# Patient Record
Sex: MALE | Race: WHITE | NOT HISPANIC OR LATINO | Employment: FULL TIME | URBAN - NONMETROPOLITAN AREA
[De-identification: names, ages, dates, MRNs, and addresses within clinical notes are randomized per-mention and may not be internally consistent; named-entity substitution may affect disease eponyms.]

---

## 2024-08-29 ENCOUNTER — OFFICE VISIT (OUTPATIENT)
Dept: UROLOGY | Facility: CLINIC | Age: 39
End: 2024-08-29
Payer: COMMERCIAL

## 2024-08-29 VITALS
WEIGHT: 270 LBS | OXYGEN SATURATION: 98 % | HEART RATE: 94 BPM | SYSTOLIC BLOOD PRESSURE: 138 MMHG | TEMPERATURE: 98.7 F | DIASTOLIC BLOOD PRESSURE: 92 MMHG | BODY MASS INDEX: 34.65 KG/M2 | HEIGHT: 74 IN

## 2024-08-29 DIAGNOSIS — N48.1 BALANITIS: ICD-10-CM

## 2024-08-29 DIAGNOSIS — R30.0 DYSURIA: Primary | ICD-10-CM

## 2024-08-29 DIAGNOSIS — R31.0 GROSS HEMATURIA: ICD-10-CM

## 2024-08-29 LAB
BILIRUB BLD-MCNC: NEGATIVE MG/DL
CLARITY, POC: CLEAR
COLOR UR: ABNORMAL
EXPIRATION DATE: ABNORMAL
GLUCOSE UR STRIP-MCNC: NEGATIVE MG/DL
KETONES UR QL: NEGATIVE
LEUKOCYTE EST, POC: NEGATIVE
Lab: ABNORMAL
NITRITE UR-MCNC: NEGATIVE MG/ML
PH UR: 6 [PH] (ref 5–8)
PROT UR STRIP-MCNC: NEGATIVE MG/DL
RBC # UR STRIP: ABNORMAL /UL
SP GR UR: 1.01 (ref 1–1.03)
UROBILINOGEN UR QL: ABNORMAL

## 2024-08-29 RX ORDER — CLOTRIMAZOLE AND BETAMETHASONE DIPROPIONATE 10; .64 MG/G; MG/G
1 CREAM TOPICAL 2 TIMES DAILY
Qty: 45 G | Refills: 0 | Status: SHIPPED | OUTPATIENT
Start: 2024-08-29 | End: 2024-09-12

## 2024-08-29 RX ORDER — LEVOTHYROXINE SODIUM 88 UG/1
1 TABLET ORAL EVERY MORNING
COMMUNITY

## 2024-08-29 RX ORDER — CETIRIZINE HYDROCHLORIDE 10 MG/1
TABLET ORAL
COMMUNITY
End: 2024-08-29

## 2024-08-29 RX ORDER — RIZATRIPTAN BENZOATE 10 MG/1
TABLET ORAL
COMMUNITY

## 2024-08-29 RX ORDER — FLUCONAZOLE 150 MG/1
TABLET ORAL
COMMUNITY
Start: 2024-08-06 | End: 2024-08-29

## 2024-08-29 RX ORDER — VENLAFAXINE HYDROCHLORIDE 37.5 MG/1
1 CAPSULE, EXTENDED RELEASE ORAL EVERY MORNING
COMMUNITY
End: 2024-08-29

## 2024-08-29 NOTE — PROGRESS NOTES
Office Visit [facility]      Patient Name: Marko Aranda  : 1985   MRN: [id number]     Chief Complaint:   Chief Complaint   Patient presents with    Establish Care     Dysuria for about 2 months urgency  Flank pain  Burning after urination       Referring Provider: Delisa Zacarias PA    History of Present Illness: Marko Aranda is a 38 y.o. male who presents today with history of hypothyroidism, migraines, and anxiety who presents with dysuria.  Starting in  he noticed some dysuria with urgency and avoided bladder irritants and did not resolve.  Symptoms were around 2 weeks and went to PCP and was diagnosed with UTI and started on macrobid.  Did not improve symptoms and days late he had gross hematuria x1.  Went to [facility] and did not show infection but noted to have blood on UA.  He finished macrobid and symptoms improved but did not fully resolve.  UA retested and reports no bacteria but was prescribed another round of macrobid.  Throughout he has had low back pain bilaterally.  He noticed a red blotchy area on glans that was itchy, no discharge.  Area became sensitive and was prescribed diflucan x2 doses.  When he took this medication he had significant burning and reports he did have some improvement in symptoms but continued to have intermittent dysuria, urgency, and backache.  He waited around 2 days and then completed 7 day course of cipro.  At that time wife was diagnosed with BV and was given metronidazole.  Currently he is still experiencing backache with urgency and mild burning after urination.  Denies split stream and dribbling.  No prior history of nephrolithiasis.  Paternal grandfather with history of stones.  Patient reports drinking lots of water, couple of cups of coffee, and sweet tea when going out.  Denies history of smoking, radiation, chemotherapy.  Exposure to second hand smoke.        Subjective      Review of System:   As noted in HPI.     Past Medical History:  "  Past Medical History:   Diagnosis Date    Hypothyroidism 2019    Migraines        Past Surgical History:   Past Surgical History:   Procedure Laterality Date    ADENOIDECTOMY         Family History:   Family History   Problem Relation Age of Onset    Hypertension Father     Arrhythmia Father         needed pacemaker    Hypothyroidism Mother     Hypothyroidism Other        Social History:   Social History     Socioeconomic History    Marital status:    Tobacco Use    Smoking status: Never     Passive exposure: Never    Smokeless tobacco: Never   Vaping Use    Vaping status: Never Used   Substance and Sexual Activity    Alcohol use: Yes     Comment: occasional    Drug use: Not Currently     Types: Marijuana    Sexual activity: Yes     Partners: Female     Birth control/protection: Condom       Medications:     Current Outpatient Medications:     levothyroxine (SYNTHROID, LEVOTHROID) 88 MCG tablet, Take 1 tablet by mouth Every Morning., Disp: , Rfl:     rizatriptan (MAXALT) 10 MG tablet, Take 1 tablet as needed at onset of migraine, can repeat dose in 2 hours if not improved, Disp: , Rfl:     clotrimazole-betamethasone (LOTRISONE) 1-0.05 % cream, Apply 1 Application topically to the appropriate area as directed 2 (Two) Times a Day for 14 days., Disp: 45 g, Rfl: 0    Allergies:   Allergies   Allergen Reactions    Shrimp Extract Other (See Comments)       Objective     Physical Exam:   Vital Signs:   Vitals:    08/29/24 1431   BP: 138/92   Pulse: 94   Temp: 98.7 °F (37.1 °C)   SpO2: 98%   Weight: 122 kg (270 lb)   Height: 188 cm (74\")     Body mass index is 34.67 kg/m².   Physical Exam  Vitals and nursing note reviewed. Exam conducted with a chaperone present.   Constitutional:       General: He is awake. He is not in acute distress.     Appearance: He is not ill-appearing.   Pulmonary:      Effort: Pulmonary effort is normal.   Genitourinary:     Penis: Uncircumcised. No phimosis, paraphimosis, hypospadias, " "tenderness, discharge, swelling or lesions.       Testes: Normal.      Epididymis:      Right: Normal.      Left: Normal.      Comments: Red irritation around urethra and on glans, white residue noted around glans  Skin:     General: Skin is warm and dry.   Neurological:      Mental Status: He is alert and oriented to person, place, and time. Mental status is at baseline.   Psychiatric:         Mood and Affect: Mood normal.         Behavior: Behavior is cooperative.              Labs  No results found for: \"PSA\"    Brief Urine Lab Results  (Last result in the past 365 days)        Color   Clarity   Blood   Leuk Est   Nitrite   Protein   CREAT   Urine HCG        08/29/24 1435 Straw   Clear   Trace   Negative   Negative   Negative                   I have reviewed the above labs.     PVR  Post-void residual performed by staff and interpreted by me - 4 mL      Assessment / Plan      Assessment  Diagnoses and all orders for this visit:    1. Dysuria (Primary)  -     POC Urinalysis Dipstick, Automated    2. Balanitis  -     clotrimazole-betamethasone (LOTRISONE) 1-0.05 % cream; Apply 1 Application topically to the appropriate area as directed 2 (Two) Times a Day for 14 days.  Dispense: 45 g; Refill: 0    3. Gross hematuria  -     CT Abdomen Pelvis With & Without Contrast; Future          Mr. Aranda is a 38 y.o.  with dysuria, penile itching, and gross hematuria started in June 2024.  Patient has been treated with oral diflucan, macrobid x2 rounds, and cipro with no resolution of symptoms.    Starting in June he noticed some dysuria with urgency and avoided bladder irritants and did not resolve.  Currently he is still experiencing backache with urgency and mild burning after urination.  Denies split stream and dribbling.  No prior history of nephrolithiasis.  Paternal grandfather with history of stones.  Patient reports drinking lots of water, couple of cups of coffee, and sweet tea when going out.      Patient reports " one episode of gross hematuria with dysuria, no positive cultures.  Denies history of smoking, radiation, chemotherapy.  Exposure to second hand smoke.  Given gross hematuria will proceed with workup including CT urogram and cystoscopy.    Exam today consistent with balanitis.  Will treat with topical lotrisone BID x2 weeks.  Will send guidance UTI for STI screening.        Plan  CT urogram  Lotrisone 1 application BID to penis x2 weeks  Cystoscopy with Dr. Espitia  Guidance for STI screening      Follow Up:   Return for with Dr. Espitia, for cysto.      FABRIZIO Jefferson  [facility] [place]

## 2024-09-03 ENCOUNTER — TELEPHONE (OUTPATIENT)
Dept: UROLOGY | Facility: CLINIC | Age: 39
End: 2024-09-03
Payer: COMMERCIAL

## 2024-09-03 NOTE — TELEPHONE ENCOUNTER
Please let patient know his urine pathogen report (Guidance UTI) was negative for bacteria or STI.  Thanks.

## 2024-09-16 ENCOUNTER — HOSPITAL ENCOUNTER (OUTPATIENT)
Dept: CT IMAGING | Facility: HOSPITAL | Age: 39
Discharge: HOME | End: 2024-09-16
Admitting: NURSE PRACTITIONER
Payer: COMMERCIAL

## 2024-09-16 DIAGNOSIS — R31.0 GROSS HEMATURIA: ICD-10-CM

## 2024-09-16 PROCEDURE — 74178 CT ABD&PLV WO CNTR FLWD CNTR: CPT

## 2024-09-16 PROCEDURE — 25510000001 IOPAMIDOL PER 1 ML: Performed by: NURSE PRACTITIONER

## 2024-09-16 RX ORDER — IOPAMIDOL 755 MG/ML
150 INJECTION, SOLUTION INTRAVASCULAR
Status: COMPLETED | OUTPATIENT
Start: 2024-09-16 | End: 2024-09-16

## 2024-09-16 RX ADMIN — IOPAMIDOL 150 ML: 755 INJECTION, SOLUTION INTRAVENOUS at 13:34

## 2024-09-17 DIAGNOSIS — K62.89 RECTAL CYST: Primary | ICD-10-CM

## 2024-09-19 ENCOUNTER — TELEPHONE (OUTPATIENT)
Dept: UROLOGY | Facility: CLINIC | Age: 39
End: 2024-09-19

## 2024-09-19 NOTE — TELEPHONE ENCOUNTER
Caller: SHUN DOMINGUEZ    Relationship: SELF    Best call back number: [phone]    What orders are you requesting (i.e. lab or imaging): PT SPOKE WITH CENTRAL SCHEDULING. CENTRAL SCHEDULING ADVISED PT THAT MUNIRA SHAFFER NEEDS TO RESUBMIT ORDER AS A REQUEST WITH AND WITHOUT CONTRAST.    In what timeframe would the patient need to come in: NOW    Where will you receive your lab/imaging services:     Additional notes:

## 2024-09-20 DIAGNOSIS — K62.89 RECTAL CYST: Primary | ICD-10-CM

## 2024-09-20 NOTE — TELEPHONE ENCOUNTER
I spoke with Marko yesterday and he is allergic to shrimp only and not other shellfish and he had a CT scan with contrast recently and did fine with that.  Not sure if it is the same contrast or not?

## 2024-09-20 NOTE — TELEPHONE ENCOUNTER
If he recently did well with contrast he will do okay with MRI.  I want him to have an MRI so I'm not sure what the issue is with central scheduling.  Can you check on this? I have ordered it with and without.

## 2024-10-07 ENCOUNTER — HOSPITAL ENCOUNTER (OUTPATIENT)
Dept: MRI IMAGING | Facility: HOSPITAL | Age: 39
Discharge: HOME | End: 2024-10-07
Admitting: NURSE PRACTITIONER
Payer: COMMERCIAL

## 2024-10-07 DIAGNOSIS — K62.89 RECTAL CYST: ICD-10-CM

## 2024-10-07 PROCEDURE — 72197 MRI PELVIS W/O & W/DYE: CPT

## 2024-10-07 PROCEDURE — A9577 INJ MULTIHANCE: HCPCS | Performed by: NURSE PRACTITIONER

## 2024-10-07 PROCEDURE — 0 GADOBENATE DIMEGLUMINE 529 MG/ML SOLUTION: Performed by: NURSE PRACTITIONER

## 2024-10-07 RX ADMIN — GADOBENATE DIMEGLUMINE 25 ML: 529 INJECTION, SOLUTION INTRAVENOUS at 17:41

## 2024-10-21 ENCOUNTER — PROCEDURE VISIT (OUTPATIENT)
Dept: UROLOGY | Facility: CLINIC | Age: 39
End: 2024-10-21
Payer: COMMERCIAL

## 2024-10-21 DIAGNOSIS — M54.50 LUMBAR BACK PAIN: ICD-10-CM

## 2024-10-21 DIAGNOSIS — R30.0 DYSURIA: Primary | ICD-10-CM

## 2024-10-21 DIAGNOSIS — N48.1 BALANITIS: ICD-10-CM

## 2024-10-21 DIAGNOSIS — R19.8: ICD-10-CM

## 2024-10-21 PROCEDURE — 99213 OFFICE O/P EST LOW 20 MIN: CPT | Performed by: UROLOGY

## 2024-10-21 NOTE — PROGRESS NOTES
"CC  Dysuria  Lumbar back pain    HPI  Mr. Aranda is a 39 y.o. male with history below in assessment, who presents for follow up.     At this visit dysuria has resolved after usage of Lotrisone cream.    Past Medical History:   Diagnosis Date    Hypothyroidism 2019    Migraines        Past Surgical History:   Procedure Laterality Date    ADENOIDECTOMY           Current Outpatient Medications:     levothyroxine (SYNTHROID, LEVOTHROID) 88 MCG tablet, Take 1 tablet by mouth Every Morning., Disp: , Rfl:     rizatriptan (MAXALT) 10 MG tablet, Take 1 tablet as needed at onset of migraine, can repeat dose in 2 hours if not improved, Disp: , Rfl:      Physical Exam  There were no vitals taken for this visit.    Labs  Brief Urine Lab Results  (Last result in the past 365 days)        Color   Clarity   Blood   Leuk Est   Nitrite   Protein   CREAT   Urine HCG        08/29/24 1435 Straw   Clear   Trace   Negative   Negative   Negative                   No results found for: \"GLUCOSE\", \"CALCIUM\", \"NA\", \"K\", \"CO2\", \"CL\", \"BUN\", \"CREATININE\", \"EGFRIFAFRI\", \"EGFRIFNONA\", \"BCR\", \"ANIONGAP\"    No results found for: \"WBC\", \"HGB\", \"HCT\", \"MCV\", \"PLT\"         No results found for: \"PSA\"          Radiographic Studies  MRI Pelvis With & Without Contrast    Result Date: 10/8/2024  Benign-appearing cystic mass in the retrorectal-presacral space without enhancing soft tissue component. Congenital/developmental benign cystic lesion such as epidermoid or dermoid or even teratoma are considerations.  This report was signed and finalized on 10/8/2024 3:48 PM by Fam Merchant MD.      CT Abdomen Pelvis With & Without Contrast    Result Date: 9/16/2024  Impression: No CT abnormalities in the kidneys or collecting system. No acute CT abnormalities in the abdomen or pelvis Oval well-circumscribed benign-appearing hypodense cyst posterior to the rectum and anterior to the coccyx may represent a tailgut cyst, duplication cyst or other type of cystic " lesion. Further evaluation with an MRI pelvis with contrast is advised Electronically Signed: Elvis Alvarado MD  9/16/2024 2:54 PM EDT  Workstation ID: [id number]      I have reviewed above labs and imaging.     Assessment  39 y.o. male with history of balanitis and dysuria, resolved with Lotrisone cream.  CT normal except for presacral cystic lesion, unrelated to urinary symptoms.  Chronic intermittent low back pain worsened by lifting weights.    Plan  1.  Referral to physical therapy for weak core/posterior chain  2.  Referral to general surgery for presacral cystic lesion, they will likely tell him nothing to do and just observation.  Likely embryologic in nature.  3.  Follow-up with me as needed.  Cystoscopy not indicated today.

## 2024-10-23 ENCOUNTER — TELEPHONE (OUTPATIENT)
Dept: SURGERY | Facility: CLINIC | Age: 39
End: 2024-10-23
Payer: COMMERCIAL

## 2024-10-23 NOTE — TELEPHONE ENCOUNTER
Tried to reach the pt regarding a referral we received from Dr. Espitia for a retrorectal mass-call could not be completed at this time per the recording-will try again to reach the pt to schedule an office apt. 1st attempt.     *IF THE PT RETURNS THIS CALL-OK TO SCHEDULE NEXT AVAILABLE WITH DR. ELIAS.*

## 2024-11-05 NOTE — PROGRESS NOTES
Subjective   Marko Aranda is a 39 y.o. male.   Chief Complaint   Patient presents with    Cyst        History of Present Illness   Mr. Aranda is a 39-year-old gentleman referred to me for evaluation of a retrorectal cystic mass found incidentally on CT imaging performed for further investigation of gross hematuria.  His initial CT was performed in mid September, and noted an oval hypodense structure compatible with a cystic lesion posterior to the rectum and anterior to the coccyx measuring 3.9 x 3.5 x 4.3 cm resulting in mild mass effect over the posterior rectal wall.  This was thought to represent a tailgut cyst, duplication cyst, or other type of cystic lesion.  Further evaluation with MRI of the pelvis was recommended.  The MRI was ordered by the urology service as recommended by the radiologist.  MRI was performed on 10/7/2024 and revealed a presacral-retrorectal mass in the posterior pelvis which was noted to be well-circumscribed with a thin capsule.  The cystic lesion measured 40 x 24 x 32 mm and was hyperintense on T2 sequences and intermediate on T1 sequences.  There was no abnormal enhancement, associated bone destruction, or extension into the sacral spinal canal.  Displacement of the rectum without involvement of the rectal wall was noted.  The imaging features were overall felt to be benign, and differential considerations were felt to include congenital cysts such as dermoid, epidermoid, teratoma, or much less likely an anterior sacral meningocele.  Based on imaging features, more aggressive tumors and neurogenic tumors were considered unlikely.    The patient initially reported that he had had no lower pelvic or rectal symptoms, however, on further questioning he did endorse some discomfort with sitting, and some pressure in the rectal region.    The following portions of the patient's history were reviewed and updated as appropriate: allergies, current medications, past family history, past  medical history, past social history, past surgical history, and problem list.      Patient Active Problem List   Diagnosis    Dysuria       Past Medical History:   Diagnosis Date    Hypothyroidism 2019    Migraines        Past Surgical History:   Procedure Laterality Date    ADENOIDECTOMY         Medications:     Current Outpatient Medications:     clotrimazole-betamethasone (LOTRISONE) 1-0.05 % cream, , Disp: , Rfl:     levothyroxine (SYNTHROID, LEVOTHROID) 88 MCG tablet, Take 1 tablet by mouth Every Morning., Disp: , Rfl:     rizatriptan (MAXALT) 10 MG tablet, Take 1 tablet as needed at onset of migraine, can repeat dose in 2 hours if not improved, Disp: , Rfl:     Allergies:   Allergies   Allergen Reactions    Shrimp Extract Other (See Comments)         Family History   Problem Relation Age of Onset    Hypertension Father         Dad had high blood pressure until after long term.    Arrhythmia Father         needed pacemaker    Arthritis Father     Hypothyroidism Mother     Hypothyroidism Other        Social History     Socioeconomic History    Marital status:    Tobacco Use    Smoking status: Never     Passive exposure: Never    Smokeless tobacco: Never    Tobacco comments:     Non-smoker.   Vaping Use    Vaping status: Never Used   Substance and Sexual Activity    Alcohol use: Yes     Comment: Occasional drinker. My wife & I enjoy wine occasionally.    Drug use: Never     Types: Marijuana    Sexual activity: Yes     Partners: Female     Birth control/protection: Condom       Review of Systems   Constitutional:  Negative for chills, fever and unexpected weight loss.   HENT:  Negative for hearing loss, trouble swallowing and voice change.    Eyes:  Negative for visual disturbance.   Respiratory:  Negative for apnea, cough, chest tightness, shortness of breath and wheezing.    Cardiovascular:  Negative for chest pain, palpitations and leg swelling.   Gastrointestinal:  Negative for abdominal distention,  "abdominal pain, anal bleeding, blood in stool, constipation, diarrhea, nausea, rectal pain, vomiting, GERD and indigestion.        See HPI   Endocrine: Negative for cold intolerance and heat intolerance.   Genitourinary:  Negative for difficulty urinating, dysuria and flank pain.   Musculoskeletal:  Negative for back pain and gait problem.   Skin:  Negative for color change, rash, skin lesions and wound.   Neurological:  Negative for dizziness, syncope, speech difficulty, weakness, light-headedness and numbness.   Hematological:  Negative for adenopathy. Does not bruise/bleed easily.   Psychiatric/Behavioral:  Negative for depressed mood. The patient is not nervous/anxious.        Objective   /88   Pulse 79   Temp 97.8 °F (36.6 °C)   Ht 188 cm (74.02\")   Wt 129 kg (285 lb 6.4 oz)   SpO2 98%   BMI 36.63 kg/m²     Physical Exam  Constitutional:       Appearance: He is well-developed.   HENT:      Head: Normocephalic and atraumatic.   Eyes:      General: No scleral icterus.  Cardiovascular:      Rate and Rhythm: Regular rhythm.   Pulmonary:      Effort: Pulmonary effort is normal.   Abdominal:      General: There is no distension.      Palpations: Abdomen is soft.      Tenderness: There is no abdominal tenderness.   Skin:     General: Skin is warm and dry.   Neurological:      Mental Status: He is alert and oriented to person, place, and time.   Psychiatric:         Behavior: Behavior normal.             Narrative & Impression   CT ABDOMEN PELVIS W WO CONTRAST     Date of Exam: 9/16/2024 12:56 PM EDT     Indication: gross hematuria. C     Comparison: None available.     Technique: Axial CT images were obtained of the abdomen and pelvis before and after the uneventful intravenous administration of intravenous contrast. Sagittal and coronal reconstructions were performed.  Automated exposure control and iterative   reconstruction methods were used.     Findings:  Lung Bases:    The visualized lung bases and " lower mediastinal structures are unremarkable.        Liver:Liver is normal in size and CT density. No focal lesions.        Biliary/Gallbladder: The gallbladder is without evidence of radiopaque stones. The biliary tree is nondilated.        Spleen:Spleen is normal in size and CT density.     Pancreas:   Pancreas shows homogeneous density. There is no evidence of pancreatic mass or peripancreatic fluid.        Kidneys: Kidneys are normal in size. There are no stones or hydronephrosis. There is symmetric excretion of contrast in the bilateral collecting systems        Adrenals: Adrenal glands are unremarkable.        Retroperitoneal/Lymph Nodes/Vasculature: No retroperitoneal adenopathy is identified by size criteria.        Gastrointestinal/Mesentery: The bowel loops are non-dilated without definite wall thickening or mass. The appendix appears within normal limits. No evidence of obstruction. No free air.        Bladder: The bladder is unremarkable     There is an oval hypodense fluid density structure compatible with a cystic lesion posterior to the rectum and anterior to the coccyx measuring 3.9 x 3.5 x 4.3 cm resulting in mild mass effect over the posterior rectal wall      Bony Structures:  Visualized bony structures are consistent with the patient's age.        IMPRESSION:  Impression:     No CT abnormalities in the kidneys or collecting system.     No acute CT abnormalities in the abdomen or pelvis     Oval well-circumscribed benign-appearing hypodense cyst posterior to the rectum and anterior to the coccyx may represent a tailgut cyst, duplication cyst or other type of cystic lesion. Further evaluation with an MRI pelvis with contrast is advised        Electronically Signed: Elvis Alvarado MD    9/16/2024 2:54 PM EDT    Workstation ID: [id number]     Narrative & Impression   MR PELVIS, WITHOUT AND WITH CONTRAST     HISTORY: Pelvic mass.     TECHNIQUE: Multiplanar MR without and with gadolinium enhancement.      FINDINGS: A presacral-retrorectal mass is noted in the posterior pelvis.  Lesion has a thin capsule and is well-circumscribed. The lesion measures  40 x 24 x 32 mm, hyperintense on T2 sequences and intermediate on T1  sequences. There is no abnormal enhancement within the lesion. There is  no associated bone destruction or extension into the sacral spinal  canal. There is displacement of the rectum without involvement of the  rectal wall. Imaging features are benign. Differential considerations  include congenital cyst such as dermoid or epidermoid, teratoma or much  less likely an anterior sacral meningocele. More aggressive tumor such  as chordoma would be considered much less likely based on imaging  features. Given the lack of enhancing component a neurogenic tumor is  considered unlikely.     IMPRESSION:  Benign-appearing cystic mass in the retrorectal-presacral  space without enhancing soft tissue component. Congenital/developmental  benign cystic lesion such as epidermoid or dermoid or even teratoma are  considerations.     This report was signed and finalized on 10/8/2024 3:48 PM by Fam Merchant MD.         Assessment & Plan   Diagnoses and all orders for this visit:    1. Pelvic mass in male (Primary)  -     Ambulatory Referral to Colorectal Surgery      Mr. rAanda is a 39-year-old gentleman incidentally found to have a presacral-retrorectal cystic mass with benign features on recent imaging.  I reviewed the images with the patient and his family in the office today on the PACS system.  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  I explained that tailgut cysts are derivatives of the embryonic postanal gut. Tailgut cysts are usually asymptomatic, and they are often discovered in adults as an incidental retrorectal mass. Complications include infection, with the formation of retrorectal abscesses and occasionally anal fistulae, and a long?term  risk of malignant change, which means that once discovered, surgical excision using an anterior or posterior approach is advised.  I explained to the patient that definitive evaluation and surgical intervention is more appropriately performed by surgeons who frequently work in the retrorectal space, such as colorectal surgery.  I offered him a referral to colorectal surgery, and he was agreeable.  The appropriate referral was placed for colorectal surgery at the [facility], and we will forward his records.  His images can be power shared electronically.  There is no need for follow-up in this office, but I did advise him to call if he has not been advised of an appointment with  colorectal within the next 7-10 business days.  At the conclusion of our discussion, he was comfortable with the plan of care.

## 2024-11-07 ENCOUNTER — OFFICE VISIT (OUTPATIENT)
Dept: SURGERY | Facility: CLINIC | Age: 39
End: 2024-11-07
Payer: COMMERCIAL

## 2024-11-07 VITALS
WEIGHT: 285.4 LBS | SYSTOLIC BLOOD PRESSURE: 130 MMHG | HEIGHT: 74 IN | BODY MASS INDEX: 36.63 KG/M2 | HEART RATE: 79 BPM | OXYGEN SATURATION: 98 % | TEMPERATURE: 97.8 F | DIASTOLIC BLOOD PRESSURE: 88 MMHG

## 2024-11-07 DIAGNOSIS — R19.00 PELVIC MASS IN MALE: Primary | ICD-10-CM

## 2024-11-07 PROCEDURE — 99203 OFFICE O/P NEW LOW 30 MIN: CPT | Performed by: SURGERY

## 2024-11-07 RX ORDER — CLOTRIMAZOLE AND BETAMETHASONE DIPROPIONATE 10; .64 MG/G; MG/G
CREAM TOPICAL
COMMUNITY

## 2024-11-14 ENCOUNTER — TELEPHONE (OUTPATIENT)
Dept: SURGERY | Facility: CLINIC | Age: 39
End: 2024-11-14
Payer: COMMERCIAL

## 2024-11-14 NOTE — TELEPHONE ENCOUNTER
Patient called to say he had called [facility] about a referral but they said they haven't received anything.

## 2024-11-15 ENCOUNTER — TELEPHONE (OUTPATIENT)
Dept: SURGERY | Facility: CLINIC | Age: 39
End: 2024-11-15
Payer: COMMERCIAL

## 2024-11-15 NOTE — TELEPHONE ENCOUNTER
ELISEO @  NEEDS COPY OF REFERRAL, DR NOTES,INSURANCE AND DEMOGRAPHICS FOR PATIENT. FAX # [phone]